# Patient Record
(demographics unavailable — no encounter records)

---

## 2024-11-27 NOTE — PHYSICAL EXAM
[LE] : Sensory: Intact in bilateral lower extremities [Normal RLE] : Right Lower Extremity: No scars, rashes, lesions, ulcers, skin intact [Normal LLE] : Left Lower Extremity: No scars, rashes, lesions, ulcers, skin intact [Normal Touch] : sensation intact for touch [Normal] : Oriented to person, place, and time, insight and judgement were intact and the affect was normal [de-identified] : RIGHT hip She walks with a nonantalgic gait. She has less good balance with mild Trendelenburg right. There is obvious visible deformity of the right gluteal region with severe atrophy and appearance of the gluteal muscles on the right compared to the left. There is also brown discoloration of the skin and the fat subcutaneously seems to have atrophied as well. Intact hip range of motion passively without pain or limitation. She can do hip abduction and straight leg raise with mild weakness on that side. Normal neurovascular exam distally. Negative straight leg raise.  Motor and sensation intact distally [de-identified] : EXAM:  X-RAY RIGHT HIP 2-3 VIEWS  10/30/24 HISTORY:  Right hip pain. TECHNIQUE: Frog-leg lateral view of the right hip and AP pelvis is submitted.  COMPARISON: None no prior studies. FINDINGS:   Osseous structures: There is no fracture or post fracture deformity. Bone mineralization appears normal. No evidence for avascular necrosis. Joints: Hip joint spaces are preserved. The sacroiliac joints and pubic symphysis are preserved. There are no osteophytes or erosive changes. No evidence for bony degenerative change or other arthropathy. Soft tissues: Unremarkable. IMPRESSION: Unremarkable right hip radiographs.  I independently reviewed the x-rays and agree that there are no changes seen on x-ray

## 2024-11-27 NOTE — HISTORY OF PRESENT ILLNESS
[de-identified] :  Ms. Parekh is a 38 year old woman who comes in for evaluation for RIGHT hip/buttock pain that started in July. She denies any trauma but notes she was working out the day prior to this starting.. The day after working out she developed and buttock area.  Over time the bruising and swelling dissipated significant pain and bruising over lateral hip then she developed deformity in the area..  Currently she still has discoloration and the area is indented. She has more mild ongoing pain. She can walk and do stairs. She has continued to work out and lift weights. She saw her PCP who referred her for xrays which were normal.  Prior to this she did have a hip dip but nothing like she has now.

## 2024-11-27 NOTE — PHYSICAL EXAM
[LE] : Sensory: Intact in bilateral lower extremities [Normal RLE] : Right Lower Extremity: No scars, rashes, lesions, ulcers, skin intact [Normal LLE] : Left Lower Extremity: No scars, rashes, lesions, ulcers, skin intact [Normal Touch] : sensation intact for touch [Normal] : Oriented to person, place, and time, insight and judgement were intact and the affect was normal [de-identified] : RIGHT hip She walks with a nonantalgic gait. She has less good balance with mild Trendelenburg right. There is obvious visible deformity of the right gluteal region with severe atrophy and appearance of the gluteal muscles on the right compared to the left. There is also brown discoloration of the skin and the fat subcutaneously seems to have atrophied as well. Intact hip range of motion passively without pain or limitation. She can do hip abduction and straight leg raise with mild weakness on that side. Normal neurovascular exam distally. Negative straight leg raise.  Motor and sensation intact distally [de-identified] : EXAM:  X-RAY RIGHT HIP 2-3 VIEWS  10/30/24 HISTORY:  Right hip pain. TECHNIQUE: Frog-leg lateral view of the right hip and AP pelvis is submitted.  COMPARISON: None no prior studies. FINDINGS:   Osseous structures: There is no fracture or post fracture deformity. Bone mineralization appears normal. No evidence for avascular necrosis. Joints: Hip joint spaces are preserved. The sacroiliac joints and pubic symphysis are preserved. There are no osteophytes or erosive changes. No evidence for bony degenerative change or other arthropathy. Soft tissues: Unremarkable. IMPRESSION: Unremarkable right hip radiographs.  I independently reviewed the x-rays and agree that there are no changes seen on x-ray

## 2024-11-27 NOTE — ASSESSMENT
[FreeTextEntry1] : 39 y/o female about 5 months following severe bruising in her right gluteal region from possible strain but unclear the original injury and now has severe atrophy of the muscles in this area.  There is visible loss of musculature. Given the unusual nature of her presentation I recommended getting an MRI of the pelvis to assess the gluteal muscles for any tears of the muscle or tendons to cause such severe atrophy.  Otherwise it might be neurogenic. In the meantime I gave her exercises to do and physical therapy to work on strengthening.  Tylenol or ibuprofen as needed for pain. I will call her with MRI results and further recommendations based on the findings.

## 2024-11-27 NOTE — HISTORY OF PRESENT ILLNESS
[de-identified] :  Ms. Parekh is a 38 year old woman who comes in for evaluation for RIGHT hip/buttock pain that started in July. She denies any trauma but notes she was working out the day prior to this starting.. The day after working out she developed and buttock area.  Over time the bruising and swelling dissipated significant pain and bruising over lateral hip then she developed deformity in the area..  Currently she still has discoloration and the area is indented. She has more mild ongoing pain. She can walk and do stairs. She has continued to work out and lift weights. She saw her PCP who referred her for xrays which were normal.  Prior to this she did have a hip dip but nothing like she has now.

## 2024-11-27 NOTE — ASSESSMENT
[FreeTextEntry1] : 37 y/o female about 5 months following severe bruising in her right gluteal region from possible strain but unclear the original injury and now has severe atrophy of the muscles in this area.  There is visible loss of musculature. Given the unusual nature of her presentation I recommended getting an MRI of the pelvis to assess the gluteal muscles for any tears of the muscle or tendons to cause such severe atrophy.  Otherwise it might be neurogenic. In the meantime I gave her exercises to do and physical therapy to work on strengthening.  Tylenol or ibuprofen as needed for pain. I will call her with MRI results and further recommendations based on the findings.